# Patient Record
Sex: FEMALE | Race: ASIAN | NOT HISPANIC OR LATINO | Employment: UNEMPLOYED | URBAN - METROPOLITAN AREA
[De-identification: names, ages, dates, MRNs, and addresses within clinical notes are randomized per-mention and may not be internally consistent; named-entity substitution may affect disease eponyms.]

---

## 2023-12-01 ENCOUNTER — SOCIAL WORK (OUTPATIENT)
Dept: ADMINISTRATIVE | Facility: OTHER | Age: 58
End: 2023-12-01

## 2023-12-01 NOTE — PROGRESS NOTES
Consulted received from [facility] to assist pt with counseling resources. Attempted to call [phone] to discuss consult received and provide counseling resources but pt was not reachable. Message was left for a return call. Will continue to follow pt and assist.       Peter Rob RO    Ext [phone]/Pager [phone]

## 2023-12-02 PROBLEM — K64.9 HEMORRHOIDS: Status: ACTIVE | Noted: 2023-12-02

## 2023-12-02 PROBLEM — F41.1 GAD (GENERALIZED ANXIETY DISORDER): Status: ACTIVE | Noted: 2023-12-02

## 2023-12-02 PROBLEM — K62.5 BRBPR (BRIGHT RED BLOOD PER RECTUM): Status: ACTIVE | Noted: 2023-12-02

## 2023-12-02 PROBLEM — R73.03 PREDIABETES: Status: ACTIVE | Noted: 2023-12-02

## 2023-12-02 PROBLEM — Z76.89 ENCOUNTER TO ESTABLISH CARE WITH NEW DOCTOR: Status: ACTIVE | Noted: 2023-12-02

## 2023-12-06 ENCOUNTER — SOCIAL WORK (OUTPATIENT)
Dept: ADMINISTRATIVE | Facility: OTHER | Age: 58
End: 2023-12-06

## 2023-12-06 NOTE — PROGRESS NOTES
Consult received from [facility] to assist pt with counseling resources. Contacted [phone] and discussed consult received with her for assistance. Informed pt that Sw can email her the list of counseling resources for further assistance. Pt verbalized understanding. Verified pt email address with her. Emailed counseling resources information to pt. No further needs were noted at that time. Will continue to assist.       Peter Rob LMSW    Ext [phone]/Pager [phone]

## 2024-02-24 PROBLEM — R74.8 ELEVATED LIVER ENZYMES: Status: ACTIVE | Noted: 2024-02-24

## 2024-02-24 PROBLEM — R10.13 EPIGASTRIC PAIN: Status: ACTIVE | Noted: 2024-02-24

## 2024-03-04 PROBLEM — K62.5 BRBPR (BRIGHT RED BLOOD PER RECTUM): Status: RESOLVED | Noted: 2023-12-02 | Resolved: 2024-03-04

## 2024-09-27 DIAGNOSIS — Z12.31 OTHER SCREENING MAMMOGRAM: ICD-10-CM

## 2024-09-30 ENCOUNTER — PATIENT MESSAGE (OUTPATIENT)
Dept: ADMINISTRATIVE | Facility: HOSPITAL | Age: 59
End: 2024-09-30

## 2024-10-19 PROBLEM — Z87.19 HISTORY OF DIVERTICULITIS: Status: ACTIVE | Noted: 2024-10-19

## 2024-10-19 PROBLEM — R14.0 BLOATING SYMPTOM: Status: ACTIVE | Noted: 2024-10-19

## 2025-02-17 ENCOUNTER — PATIENT MESSAGE (OUTPATIENT)
Dept: ADMINISTRATIVE | Facility: HOSPITAL | Age: 60
End: 2025-02-17

## 2025-04-27 PROBLEM — K57.30 DIVERTICULOSIS LARGE INTESTINE W/O PERFORATION OR ABSCESS W/O BLEEDING: Status: ACTIVE | Noted: 2025-04-27

## 2025-05-27 ENCOUNTER — PATIENT OUTREACH (OUTPATIENT)
Dept: ADMINISTRATIVE | Facility: HOSPITAL | Age: 60
End: 2025-05-27